# Patient Record
Sex: FEMALE | Race: WHITE | NOT HISPANIC OR LATINO
[De-identification: names, ages, dates, MRNs, and addresses within clinical notes are randomized per-mention and may not be internally consistent; named-entity substitution may affect disease eponyms.]

---

## 2020-08-03 ENCOUNTER — APPOINTMENT (OUTPATIENT)
Dept: OBGYN | Facility: CLINIC | Age: 65
End: 2020-08-03
Payer: COMMERCIAL

## 2020-08-03 VITALS — WEIGHT: 162 LBS | TEMPERATURE: 98.2 F | BODY MASS INDEX: 26.99 KG/M2 | HEIGHT: 65 IN

## 2020-08-03 DIAGNOSIS — K76.9 LIVER DISEASE, UNSPECIFIED: ICD-10-CM

## 2020-08-03 DIAGNOSIS — N95.2 POSTMENOPAUSAL ATROPHIC VAGINITIS: ICD-10-CM

## 2020-08-03 DIAGNOSIS — R18.8 OTHER ASCITES: ICD-10-CM

## 2020-08-03 DIAGNOSIS — Z01.419 ENCOUNTER FOR GYNECOLOGICAL EXAMINATION (GENERAL) (ROUTINE) W/OUT ABNORMAL FINDINGS: ICD-10-CM

## 2020-08-03 DIAGNOSIS — R10.2 PELVIC AND PERINEAL PAIN: ICD-10-CM

## 2020-08-03 LAB
BILIRUB UR QL STRIP: NORMAL
GLUCOSE UR-MCNC: NORMAL
HCG UR QL: 0.2 EU/DL
HGB UR QL STRIP.AUTO: NORMAL
KETONES UR-MCNC: NORMAL
LEUKOCYTE ESTERASE UR QL STRIP: NORMAL
NITRITE UR QL STRIP: NORMAL
PH UR STRIP: 5
PROT UR STRIP-MCNC: NORMAL
SP GR UR STRIP: 1.03

## 2020-08-03 PROCEDURE — 99201 OFFICE OUTPATIENT NEW 10 MINUTES: CPT | Mod: 25

## 2020-08-03 PROCEDURE — 99386 PREV VISIT NEW AGE 40-64: CPT

## 2020-08-03 PROCEDURE — 81003 URINALYSIS AUTO W/O SCOPE: CPT | Mod: QW

## 2020-08-03 RX ORDER — ALPRAZOLAM 2 MG/1
2 TABLET ORAL
Refills: 0 | Status: ACTIVE | COMMUNITY

## 2020-08-03 NOTE — PHYSICAL EXAM
[Awake] : awake [Alert] : alert [Soft] : soft [Oriented x3] : oriented to person, place, and time [Normal] : uterus [Uterine Adnexae] : were not tender and not enlarged [Acute Distress] : no acute distress [LAD] : no lymphadenopathy [Thyroid Nodule] : no thyroid nodule [Goiter] : no goiter [Nipple Discharge] : no nipple discharge [Mass] : no breast mass [Tender] : non tender [Axillary LAD] : no axillary lymphadenopathy [Depressed Mood] : not depressed [Distended] : not distended [Atrophy] : atrophy [Pap Obtained] : a Pap smear was performed [RRR, No Murmurs] : RRR, no murmurs [CTAB] : CTAB

## 2020-08-05 LAB — HPV HIGH+LOW RISK DNA PNL CVX: NOT DETECTED

## 2020-08-06 ENCOUNTER — APPOINTMENT (OUTPATIENT)
Dept: OBGYN | Facility: CLINIC | Age: 65
End: 2020-08-06
Payer: COMMERCIAL

## 2020-08-06 PROCEDURE — 76705 ECHO EXAM OF ABDOMEN: CPT

## 2020-08-06 PROCEDURE — 76830 TRANSVAGINAL US NON-OB: CPT

## 2021-01-23 ENCOUNTER — OUTPATIENT (OUTPATIENT)
Dept: OUTPATIENT SERVICES | Facility: HOSPITAL | Age: 66
LOS: 1 days | Discharge: HOME | End: 2021-01-23

## 2021-01-23 ENCOUNTER — LABORATORY RESULT (OUTPATIENT)
Age: 66
End: 2021-01-23

## 2021-01-23 DIAGNOSIS — Z11.59 ENCOUNTER FOR SCREENING FOR OTHER VIRAL DISEASES: ICD-10-CM

## 2021-01-25 ENCOUNTER — APPOINTMENT (OUTPATIENT)
Dept: INFUSION THERAPY | Facility: CLINIC | Age: 66
End: 2021-01-25

## 2021-02-08 ENCOUNTER — APPOINTMENT (OUTPATIENT)
Dept: INFUSION THERAPY | Facility: CLINIC | Age: 66
End: 2021-02-08
Payer: COMMERCIAL

## 2021-02-08 ENCOUNTER — APPOINTMENT (OUTPATIENT)
Dept: HEMATOLOGY ONCOLOGY | Facility: CLINIC | Age: 66
End: 2021-02-08
Payer: COMMERCIAL

## 2021-02-08 ENCOUNTER — LABORATORY RESULT (OUTPATIENT)
Age: 66
End: 2021-02-08

## 2021-02-08 VITALS
TEMPERATURE: 97.3 F | SYSTOLIC BLOOD PRESSURE: 114 MMHG | WEIGHT: 150 LBS | RESPIRATION RATE: 14 BRPM | HEIGHT: 65 IN | BODY MASS INDEX: 24.99 KG/M2 | HEART RATE: 67 BPM | DIASTOLIC BLOOD PRESSURE: 60 MMHG

## 2021-02-08 PROCEDURE — 99204 OFFICE O/P NEW MOD 45 MIN: CPT

## 2021-02-09 ENCOUNTER — TRANSCRIPTION ENCOUNTER (OUTPATIENT)
Age: 66
End: 2021-02-09

## 2021-02-09 LAB
HCT VFR BLD CALC: 44.3 %
HGB BLD-MCNC: 15.3 G/DL
MCHC RBC-ENTMCNC: 29.5 PG
MCHC RBC-ENTMCNC: 34.5 G/DL
MCV RBC AUTO: 85.4 FL
PLATELET # BLD AUTO: 258 K/UL
PMV BLD: 11.4 FL
RBC # BLD: 5.19 M/UL
RBC # FLD: 12 %
WBC # FLD AUTO: 7.24 K/UL

## 2021-02-09 NOTE — ASSESSMENT
[FreeTextEntry1] : Double heterozygous hemochromatosis. Initial work up, especially ferritin level, not available. S/P 2 phlebotomies with the most recent ferritin showing a value of 200. She is here essentially to continue with her phlebotomies.\par The patient was told what hemochromatosis was and the risks involved. However, she was reassured that she did not have the homozygous C282Y mutation which would have been a much riskier situation. \par Donating blood should not hurt, but the patient's CBC and ferritin should be monitored closely to avoid overtreatment. \par All her questions answered.\par \par She was recommended to follow up with all her other physicians.\par She was also told about the recommended requirements for cancer screenings (breast, Pap smear, colonoscopy). For those, she will follow up with her primary care physician\par \par All questions, which she had many (including several not related to iron overload or phlebotomy), answered.\par \par The patient does not need follow up with us more than once or twice a year. She will be mostly supervised by her other physicians and referred back as needed. We will monitor her for phlebotomy purposes.

## 2021-02-09 NOTE — HISTORY OF PRESENT ILLNESS
[Disease:__________________________] : Disease: [unfilled] [de-identified] : The patient is a 65 year old white female referred by Dr. Saw Aguirre for evaluation and management of hemochromatosis and continuation of phlebotomies.\par The patient was diagnosed with the disease in May.\par The patient was also diagnosed with Covid-19 about a month earlier. \par The patient was having some symptoms about 10 years ago when she started feeling somewhat foggy. She assumed that she was depressed and was treated with different drugs. Eventually, she developed having leg pains. The patient had lost her periods more than 10 years ago. She started also having personality changes, anxiety but she was having a lot going in the family. \par The patient lives with her  who had his leg amputated because of complications from vascular disease.\par The patient is not aware of any liver disease. She had a RLQ pain for which she was sent for a CT of the abdomen and pelvis. UTI was suspected but turned out to be negative. Apparently there was "a mass in the liver, connected to the gallbladder" but she was checked and told not to worry about. \par Review of her CT results showed a 1.6 x1.9 cm lesion for which an MRI was recommended.\par Eventually she was worked up further and she was told that she had hemochromatosis. The patient had then 2 phlebotomies. The patient had hemochromatosis gene mutation test which compound heterozygosity for C282Y and H63D mutations. At that point, she was referred to us. It should be mentioned that the patient already had 2 phlebotomies already.\par The patient had different symptoms. The patient had abdominal complaints including pain, constipation, diarrhea. Her colonoscopy was about 5 months ago and was found to have a small polyp. She was recommended to have another colonoscopy in 3 years.\par \par In any event, the patient was essentially referred to continue with phlebotomies since she had already 2 such interventions and since the blood bank has closed its facility for therapeutic phlebotomies.

## 2021-02-09 NOTE — REVIEW OF SYSTEMS
[Vision Problems] : vision problems [Loss of Hearing] : loss of hearing [Palpitations] : palpitations [Shortness Of Breath] : shortness of breath [Abdominal Pain] : abdominal pain [Constipation] : constipation [Diarrhea] : diarrhea [Joint Pain] : joint pain [Joint Stiffness] : joint stiffness [Dizziness] : dizziness [Fainting] : fainting [Insomnia] : insomnia [Negative] : Heme/Lymph [FreeTextEntry2] : Lost 20 lbs [FreeTextEntry3] : Blurry [FreeTextEntry4] : Right ear mostly since childhood [FreeTextEntry6] : From anxiety [FreeTextEntry9] : Especially knees

## 2021-03-03 ENCOUNTER — LABORATORY RESULT (OUTPATIENT)
Age: 66
End: 2021-03-03

## 2021-03-03 ENCOUNTER — APPOINTMENT (OUTPATIENT)
Dept: INFUSION THERAPY | Facility: CLINIC | Age: 66
End: 2021-03-03

## 2021-03-03 DIAGNOSIS — Z00.00 ENCOUNTER FOR GENERAL ADULT MEDICAL EXAMINATION W/OUT ABNORMAL FINDINGS: ICD-10-CM

## 2021-03-03 LAB
HCT VFR BLD CALC: 43.1 %
HGB BLD-MCNC: 14.8 G/DL
MCHC RBC-ENTMCNC: 29.5 PG
MCHC RBC-ENTMCNC: 34.3 G/DL
MCV RBC AUTO: 85.9 FL
PLATELET # BLD AUTO: 225 K/UL
PMV BLD: 12.8 FL
RBC # BLD: 5.02 M/UL
RBC # FLD: 12.3 %
WBC # FLD AUTO: 7.6 K/UL

## 2021-03-29 ENCOUNTER — APPOINTMENT (OUTPATIENT)
Dept: INFUSION THERAPY | Facility: CLINIC | Age: 66
End: 2021-03-29
Payer: COMMERCIAL

## 2021-03-29 ENCOUNTER — OUTPATIENT (OUTPATIENT)
Dept: OUTPATIENT SERVICES | Facility: HOSPITAL | Age: 66
LOS: 1 days | Discharge: HOME | End: 2021-03-29

## 2021-03-29 ENCOUNTER — APPOINTMENT (OUTPATIENT)
Dept: HEMATOLOGY ONCOLOGY | Facility: CLINIC | Age: 66
End: 2021-03-29
Payer: COMMERCIAL

## 2021-03-29 ENCOUNTER — LABORATORY RESULT (OUTPATIENT)
Age: 66
End: 2021-03-29

## 2021-03-29 VITALS
SYSTOLIC BLOOD PRESSURE: 116 MMHG | DIASTOLIC BLOOD PRESSURE: 68 MMHG | WEIGHT: 147 LBS | TEMPERATURE: 97.6 F | BODY MASS INDEX: 24.49 KG/M2 | RESPIRATION RATE: 14 BRPM | HEART RATE: 86 BPM | HEIGHT: 65 IN

## 2021-03-29 DIAGNOSIS — Z14.8 GENETIC CARRIER OF OTHER DISEASE: ICD-10-CM

## 2021-03-29 LAB
HCT VFR BLD CALC: 42.7 %
HGB BLD-MCNC: 14.5 G/DL
MCHC RBC-ENTMCNC: 28.8 PG
MCHC RBC-ENTMCNC: 34 G/DL
MCV RBC AUTO: 84.9 FL
PLATELET # BLD AUTO: 209 K/UL
PMV BLD: 11.6 FL
RBC # BLD: 5.03 M/UL
RBC # FLD: 12 %
WBC # FLD AUTO: 7.3 K/UL

## 2021-03-29 PROCEDURE — 99213 OFFICE O/P EST LOW 20 MIN: CPT

## 2021-03-30 LAB
ALBUMIN SERPL ELPH-MCNC: 4.5 G/DL
ALP BLD-CCNC: 68 U/L
ALT SERPL-CCNC: 6 U/L
ANION GAP SERPL CALC-SCNC: 10 MMOL/L
AST SERPL-CCNC: 13 U/L
BILIRUB SERPL-MCNC: 0.5 MG/DL
BUN SERPL-MCNC: 23 MG/DL
CALCIUM SERPL-MCNC: 9.7 MG/DL
CHLORIDE SERPL-SCNC: 104 MMOL/L
CO2 SERPL-SCNC: 27 MMOL/L
CREAT SERPL-MCNC: 0.6 MG/DL
FERRITIN SERPL-MCNC: 49 NG/ML
GLUCOSE SERPL-MCNC: 83 MG/DL
IRON SATN MFR SERPL: 37 %
IRON SERPL-MCNC: 117 UG/DL
POTASSIUM SERPL-SCNC: 4.6 MMOL/L
PROT SERPL-MCNC: 7.2 G/DL
SODIUM SERPL-SCNC: 141 MMOL/L
TIBC SERPL-MCNC: 317 UG/DL
UIBC SERPL-MCNC: 200 UG/DL

## 2021-03-30 NOTE — CONSULT LETTER
[Dear  ___] : Dear  [unfilled], [Courtesy Letter:] : I had the pleasure of seeing your patient, [unfilled], in my office today. [Please see my note below.] : Please see my note below. [Consult Closing:] : Thank you very much for allowing me to participate in the care of this patient.  If you have any questions, please do not hesitate to contact me. [Sincerely,] : Sincerely, [FreeTextEntry3] : Dr. MARIFER Christensen [FreeTextEntry2] : Dr. Aguirre

## 2021-03-30 NOTE — HISTORY OF PRESENT ILLNESS
[de-identified] : The patient is here for follow up for her history of double heterozygous hemochromatosis.\par She has no new particular complaints. They have been more or less the same ones as at the time of about 2 months ago her initial visit.\par She is on no new medications.\par She had multiple questions concerning her other complaints which are being followed by her gastroenterologist as well as the primary care physician. Her ;s condition (stroke) is taking a big toll on her.\par Her physical examination in February was essentially not contributory with no obvious abnormalities noted.\par We discussed also her last blood results from beginning of this month.\par She was reminded once more about her screenings. She is following also with her gynecologist regularly. \par We will repeat her CBC, CMP, iron studies with ferritin. We will try to keep ferritin just below 100 avoiding overtreatment. The patient was also given a request for a mammogram for which she's overdue.\par \par All her questions answered.\par \par Further recommendations after the above test results are available.\par \par The patient does not need to see us more often than twice a year and as needed, as long as she keeps her appointments with her PMD, the gynecologist and the gastroenterologist.\par

## 2021-04-15 DIAGNOSIS — E83.119 HEMOCHROMATOSIS, UNSPECIFIED: ICD-10-CM

## 2021-05-28 ENCOUNTER — OUTPATIENT (OUTPATIENT)
Dept: OUTPATIENT SERVICES | Facility: HOSPITAL | Age: 66
LOS: 1 days | Discharge: HOME | End: 2021-05-28
Payer: COMMERCIAL

## 2021-05-28 ENCOUNTER — LABORATORY RESULT (OUTPATIENT)
Age: 66
End: 2021-05-28

## 2021-05-28 ENCOUNTER — RESULT REVIEW (OUTPATIENT)
Age: 66
End: 2021-05-28

## 2021-05-28 ENCOUNTER — APPOINTMENT (OUTPATIENT)
Dept: HEMATOLOGY ONCOLOGY | Facility: CLINIC | Age: 66
End: 2021-05-28

## 2021-05-28 DIAGNOSIS — Z12.31 ENCOUNTER FOR SCREENING MAMMOGRAM FOR MALIGNANT NEOPLASM OF BREAST: ICD-10-CM

## 2021-05-28 LAB
HCT VFR BLD CALC: 42.3 %
HGB BLD-MCNC: 14.4 G/DL
MCHC RBC-ENTMCNC: 28.8 PG
MCHC RBC-ENTMCNC: 34 G/DL
MCV RBC AUTO: 84.6 FL
PLATELET # BLD AUTO: 222 K/UL
PMV BLD: 11.5 FL
RBC # BLD: 5 M/UL
RBC # FLD: 12.4 %
WBC # FLD AUTO: 6.42 K/UL

## 2021-05-28 PROCEDURE — 77067 SCR MAMMO BI INCL CAD: CPT | Mod: 26

## 2021-05-28 PROCEDURE — 77063 BREAST TOMOSYNTHESIS BI: CPT | Mod: 26

## 2021-06-01 LAB — FERRITIN SERPL-MCNC: 46 NG/ML

## 2021-06-07 ENCOUNTER — APPOINTMENT (OUTPATIENT)
Dept: HEMATOLOGY ONCOLOGY | Facility: CLINIC | Age: 66
End: 2021-06-07
Payer: COMMERCIAL

## 2021-06-07 ENCOUNTER — LABORATORY RESULT (OUTPATIENT)
Age: 66
End: 2021-06-07

## 2021-06-07 ENCOUNTER — APPOINTMENT (OUTPATIENT)
Dept: INFUSION THERAPY | Facility: CLINIC | Age: 66
End: 2021-06-07
Payer: COMMERCIAL

## 2021-06-07 LAB
HCT VFR BLD CALC: 43.7 %
HGB BLD-MCNC: 14.9 G/DL
MCHC RBC-ENTMCNC: 29.1 PG
MCHC RBC-ENTMCNC: 34.1 G/DL
MCV RBC AUTO: 85.4 FL
PLATELET # BLD AUTO: 124 K/UL
PMV BLD: 12.4 FL
RBC # BLD: 5.12 M/UL
RBC # FLD: 12.3 %
WBC # FLD AUTO: 6.35 K/UL

## 2021-06-07 PROCEDURE — 99213 OFFICE O/P EST LOW 20 MIN: CPT

## 2021-06-07 NOTE — HISTORY OF PRESENT ILLNESS
[de-identified] : The patient is coming for a follow up for her hemochromatosis and possible phlebotomy.\par Her major complaint seems to be her hair loss. Otherwise, no new particular complaints including gynecological ones.\par The CBC today showed a Hgb of 14.9, WBC 6.35, platelets 124 which is an aberration since the patient's multiple CBCs in the recent past had not shown any thrombocytopenia.\par Her last ferritin from about 10 days ago was only 46.\par The patient was told that she did not require phlebotomy today. The CBC should be repeated in 2 months.\par She was recommended also to see a dermatologist for her hair loss. In the meantime, we will also draw a T4 and TSH to make sure that at least she does not have thyroid issues explaining the hair loss.\par \par All questions answered.

## 2021-06-08 LAB
T4 SERPL-MCNC: 6.8 UG/DL
TSH SERPL-ACNC: 1.31 UIU/ML

## 2021-07-30 ENCOUNTER — APPOINTMENT (OUTPATIENT)
Dept: HEMATOLOGY ONCOLOGY | Facility: CLINIC | Age: 66
End: 2021-07-30

## 2021-07-30 ENCOUNTER — LABORATORY RESULT (OUTPATIENT)
Age: 66
End: 2021-07-30

## 2021-07-30 ENCOUNTER — OUTPATIENT (OUTPATIENT)
Dept: OUTPATIENT SERVICES | Facility: HOSPITAL | Age: 66
LOS: 1 days | Discharge: HOME | End: 2021-07-30

## 2021-07-30 DIAGNOSIS — L65.9 NONSCARRING HAIR LOSS, UNSPECIFIED: ICD-10-CM

## 2021-07-30 DIAGNOSIS — E83.119 HEMOCHROMATOSIS, UNSPECIFIED: ICD-10-CM

## 2021-07-30 LAB
HCT VFR BLD CALC: 43.8 %
HGB BLD-MCNC: 14.9 G/DL
MCHC RBC-ENTMCNC: 29.2 PG
MCHC RBC-ENTMCNC: 34 G/DL
MCV RBC AUTO: 85.7 FL
PLATELET # BLD AUTO: 242 K/UL
PMV BLD: 11 FL
RBC # BLD: 5.11 M/UL
RBC # FLD: 11.9 %
WBC # FLD AUTO: 5.82 K/UL

## 2021-08-02 LAB
COVID-19 NUCLEOCAPSID  GAM ANTIBODY INTERPRETATION: POSITIVE
FERRITIN SERPL-MCNC: 92 NG/ML
SARS-COV-2 AB SERPL QL IA: 37.9 INDEX

## 2021-09-09 ENCOUNTER — OUTPATIENT (OUTPATIENT)
Dept: OUTPATIENT SERVICES | Facility: HOSPITAL | Age: 66
LOS: 1 days | Discharge: HOME | End: 2021-09-09
Payer: MEDICARE

## 2021-09-09 ENCOUNTER — RESULT REVIEW (OUTPATIENT)
Age: 66
End: 2021-09-09

## 2021-09-09 DIAGNOSIS — R92.8 OTHER ABNORMAL AND INCONCLUSIVE FINDINGS ON DIAGNOSTIC IMAGING OF BREAST: ICD-10-CM

## 2021-09-09 PROCEDURE — 77065 DX MAMMO INCL CAD UNI: CPT | Mod: 26,RT

## 2021-09-09 PROCEDURE — G0279: CPT | Mod: 26

## 2021-09-09 PROCEDURE — 76642 ULTRASOUND BREAST LIMITED: CPT | Mod: 26,RT

## 2021-09-15 ENCOUNTER — OUTPATIENT (OUTPATIENT)
Dept: OUTPATIENT SERVICES | Facility: HOSPITAL | Age: 66
LOS: 1 days | Discharge: HOME | End: 2021-09-15

## 2021-09-15 ENCOUNTER — LABORATORY RESULT (OUTPATIENT)
Age: 66
End: 2021-09-15

## 2021-09-15 ENCOUNTER — APPOINTMENT (OUTPATIENT)
Dept: HEMATOLOGY ONCOLOGY | Facility: CLINIC | Age: 66
End: 2021-09-15

## 2021-09-16 DIAGNOSIS — E83.119 HEMOCHROMATOSIS, UNSPECIFIED: ICD-10-CM

## 2021-09-16 LAB
FERRITIN SERPL-MCNC: 77 NG/ML
HCT VFR BLD CALC: 43.9 %
HGB BLD-MCNC: 14.6 G/DL
MCHC RBC-ENTMCNC: 28.9 PG
MCHC RBC-ENTMCNC: 33.3 G/DL
MCV RBC AUTO: 86.9 FL
PLATELET # BLD AUTO: 245 K/UL
PMV BLD: 10.9 FL
RBC # BLD: 5.05 M/UL
RBC # FLD: 12.1 %
WBC # FLD AUTO: 5.52 K/UL

## 2021-10-21 ENCOUNTER — APPOINTMENT (OUTPATIENT)
Dept: HEMATOLOGY ONCOLOGY | Facility: CLINIC | Age: 66
End: 2021-10-21

## 2021-10-25 ENCOUNTER — APPOINTMENT (OUTPATIENT)
Dept: HEMATOLOGY ONCOLOGY | Facility: CLINIC | Age: 66
End: 2021-10-25
Payer: MEDICARE

## 2021-10-25 ENCOUNTER — OUTPATIENT (OUTPATIENT)
Dept: OUTPATIENT SERVICES | Facility: HOSPITAL | Age: 66
LOS: 1 days | Discharge: HOME | End: 2021-10-25

## 2021-10-25 DIAGNOSIS — E83.119 HEMOCHROMATOSIS, UNSPECIFIED: ICD-10-CM

## 2021-10-25 PROCEDURE — 99442: CPT | Mod: 95

## 2021-10-26 NOTE — HISTORY OF PRESENT ILLNESS
[Home] : at home, [unfilled] , at the time of the visit. [Medical Office: ([facility])___] : at the medical office located in  [Verbal consent obtained from patient] : the patient, [unfilled] [Disease:__________________________] : Disease: [unfilled] [de-identified] : The patient wanted to have a Telehealth visit instead of coming to the office. She has heterozygous mutation of both C282Y and H63d genes.\par At present, she is denying any new particular complaints.\par Since starting on Biotin, her hair loss has stopped.\par She had received the Covid-19 vaccine a couple of months ago but after the second injection she ended up being sick for almost 9 days. Since, she has recovered.\par She is on no new medications.\par We went over her blood work results from September 15: Her ferritin was 77 and her hemogram was within normal limits. We also discussed the results of her thyroid function tests.\par She was told that she didn't need any phlebotomy at this time. \par Although with some delay, but she has finally proceeded with her mammogram as well as US evaluation of her breasts and no suspicious lesions were seen.\par The patient had multiple questions about the present situation with Covid all of which were answered to her satisfaction.\par The patient was recommended to have her iron studies repeated in 2-3 months.\par \par All questions answered.\par

## 2021-10-26 NOTE — REVIEW OF SYSTEMS
[Vision Problems] : vision problems [Loss of Hearing] : loss of hearing [Palpitations] : palpitations [Shortness Of Breath] : shortness of breath [Constipation] : constipation [Joint Pain] : joint pain [Joint Stiffness] : joint stiffness [Dizziness] : dizziness [Insomnia] : insomnia [Negative] : Heme/Lymph

## 2022-03-25 ENCOUNTER — LABORATORY RESULT (OUTPATIENT)
Age: 67
End: 2022-03-25

## 2022-03-25 ENCOUNTER — APPOINTMENT (OUTPATIENT)
Dept: HEMATOLOGY ONCOLOGY | Facility: CLINIC | Age: 67
End: 2022-03-25
Payer: MEDICARE

## 2022-03-25 ENCOUNTER — OUTPATIENT (OUTPATIENT)
Dept: OUTPATIENT SERVICES | Facility: HOSPITAL | Age: 67
LOS: 1 days | Discharge: HOME | End: 2022-03-25

## 2022-03-25 VITALS
SYSTOLIC BLOOD PRESSURE: 102 MMHG | TEMPERATURE: 97.2 F | RESPIRATION RATE: 18 BRPM | BODY MASS INDEX: 22.99 KG/M2 | WEIGHT: 138 LBS | HEART RATE: 72 BPM | DIASTOLIC BLOOD PRESSURE: 58 MMHG | HEIGHT: 65 IN

## 2022-03-25 LAB
HCT VFR BLD CALC: 41.8 %
HGB BLD-MCNC: 14.2 G/DL
MCHC RBC-ENTMCNC: 29.1 PG
MCHC RBC-ENTMCNC: 34 G/DL
MCV RBC AUTO: 85.7 FL
PLATELET # BLD AUTO: 236 K/UL
PMV BLD: 10 FL
RBC # BLD: 4.88 M/UL
RBC # FLD: 12.3 %
WBC # FLD AUTO: 5.94 K/UL

## 2022-03-25 PROCEDURE — 99214 OFFICE O/P EST MOD 30 MIN: CPT

## 2022-03-25 RX ORDER — SERTRALINE HYDROCHLORIDE 25 MG/1
25 TABLET, FILM COATED ORAL
Refills: 0 | Status: DISCONTINUED | COMMUNITY
End: 2022-03-25

## 2022-03-25 NOTE — PHYSICAL EXAM
[Restricted in physically strenuous activity but ambulatory and able to carry out work of a light or sedentary nature] : Status 1- Restricted in physically strenuous activity but ambulatory and able to carry out work of a light or sedentary nature, e.g., light house work, office work [Normal] : affect appropriate [de-identified] : Arthritic changes

## 2022-03-25 NOTE — REVIEW OF SYSTEMS
[Fatigue] : fatigue [Vision Problems] : vision problems [Abdominal Pain] : abdominal pain [Joint Pain] : joint pain [Insomnia] : insomnia [Negative] : Heme/Lymph [FreeTextEntry3] : Blurry [FreeTextEntry5] : Cold legs only when stomach hurts. Responds to heating pad. [FreeTextEntry8] : However, may have a urinary problem when she gets very complicated. [de-identified] : Psoriasis on scalp

## 2022-03-25 NOTE — HISTORY OF PRESENT ILLNESS
[Disease:__________________________] : Disease: [unfilled] [de-identified] : The patient is coming for a follow up for her combined heterozygous mutation of the hemochromatosis gene.\par She has her chronic arthralgias on and off.\par She thinks that her brain is not functioning as well and is having some memory deficits on and off and is having difficulty dealing with many problems that she was able to performed at once.\par The patient is up to date with her screenings.\par She had a CT of the abdomen and pelvis which didn't show any significant abnormalities.\par She had Covid last April.

## 2022-03-25 NOTE — ASSESSMENT
[FreeTextEntry1] : 1. 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 Anxiety/depression. \par \par The situation was discussed with the patient.\par Will obtain CBC, CMP and iron studies including ferritin.\par She is up to date with her screenings.\par Dietary advice given.\par \par Further recommendations after the above completed.\par \par All questions answered.\par \par If the above tests are within acceptable limits, she will be seen again in 6 -12 months and as needed.\par

## 2022-03-28 DIAGNOSIS — E83.119 HEMOCHROMATOSIS, UNSPECIFIED: ICD-10-CM

## 2022-03-28 LAB
ALBUMIN SERPL ELPH-MCNC: 4.2 G/DL
ALP BLD-CCNC: 62 U/L
ALT SERPL-CCNC: <5 U/L
ANION GAP SERPL CALC-SCNC: 9 MMOL/L
AST SERPL-CCNC: 10 U/L
BILIRUB SERPL-MCNC: 0.7 MG/DL
BUN SERPL-MCNC: 18 MG/DL
CALCIUM SERPL-MCNC: 9.2 MG/DL
CHLORIDE SERPL-SCNC: 103 MMOL/L
CO2 SERPL-SCNC: 25 MMOL/L
CREAT SERPL-MCNC: 0.5 MG/DL
EGFR: 104 ML/MIN/1.73M2
FERRITIN SERPL-MCNC: 99 NG/ML
GLUCOSE SERPL-MCNC: 80 MG/DL
IRON SATN MFR SERPL: 55 %
IRON SERPL-MCNC: 137 UG/DL
POTASSIUM SERPL-SCNC: 4.3 MMOL/L
PROT SERPL-MCNC: 6.4 G/DL
SODIUM SERPL-SCNC: 137 MMOL/L
TIBC SERPL-MCNC: 248 UG/DL
UIBC SERPL-MCNC: 111 UG/DL

## 2022-07-26 ENCOUNTER — OUTPATIENT (OUTPATIENT)
Dept: OUTPATIENT SERVICES | Facility: HOSPITAL | Age: 67
LOS: 1 days | Discharge: HOME | End: 2022-07-26

## 2022-07-26 ENCOUNTER — LABORATORY RESULT (OUTPATIENT)
Age: 67
End: 2022-07-26

## 2022-07-26 ENCOUNTER — APPOINTMENT (OUTPATIENT)
Dept: HEMATOLOGY ONCOLOGY | Facility: CLINIC | Age: 67
End: 2022-07-26

## 2022-07-26 LAB
HCT VFR BLD CALC: 43.9 %
HGB BLD-MCNC: 15 G/DL
MCHC RBC-ENTMCNC: 29.5 PG
MCHC RBC-ENTMCNC: 34.2 G/DL
MCV RBC AUTO: 86.2 FL
PLATELET # BLD AUTO: 248 K/UL
PMV BLD: 10.6 FL
RBC # BLD: 5.09 M/UL
RBC # FLD: 12 %
WBC # FLD AUTO: 6.74 K/UL

## 2022-07-27 LAB
ALBUMIN SERPL ELPH-MCNC: 4.5 G/DL
ALP BLD-CCNC: 78 U/L
ALT SERPL-CCNC: 9 U/L
ANION GAP SERPL CALC-SCNC: 11 MMOL/L
AST SERPL-CCNC: 13 U/L
BILIRUB SERPL-MCNC: 0.3 MG/DL
BUN SERPL-MCNC: 25 MG/DL
CALCIUM SERPL-MCNC: 9.3 MG/DL
CHLORIDE SERPL-SCNC: 102 MMOL/L
CO2 SERPL-SCNC: 26 MMOL/L
CREAT SERPL-MCNC: 0.5 MG/DL
EGFR: 104 ML/MIN/1.73M2
FERRITIN SERPL-MCNC: 111 NG/ML
GLUCOSE SERPL-MCNC: 74 MG/DL
IRON SATN MFR SERPL: 32 %
IRON SERPL-MCNC: 78 UG/DL
POTASSIUM SERPL-SCNC: 5.2 MMOL/L
PROT SERPL-MCNC: 6.7 G/DL
SODIUM SERPL-SCNC: 139 MMOL/L
TIBC SERPL-MCNC: 244 UG/DL
UIBC SERPL-MCNC: 166 UG/DL

## 2022-08-18 DIAGNOSIS — E83.119 HEMOCHROMATOSIS, UNSPECIFIED: ICD-10-CM

## 2023-02-21 ENCOUNTER — APPOINTMENT (OUTPATIENT)
Age: 68
End: 2023-02-21

## 2023-03-10 ENCOUNTER — LABORATORY RESULT (OUTPATIENT)
Age: 68
End: 2023-03-10

## 2023-03-10 ENCOUNTER — OUTPATIENT (OUTPATIENT)
Dept: OUTPATIENT SERVICES | Facility: HOSPITAL | Age: 68
LOS: 1 days | End: 2023-03-10
Payer: MEDICARE

## 2023-03-10 ENCOUNTER — APPOINTMENT (OUTPATIENT)
Dept: HEMATOLOGY ONCOLOGY | Facility: CLINIC | Age: 68
End: 2023-03-10

## 2023-03-10 DIAGNOSIS — E83.119 HEMOCHROMATOSIS, UNSPECIFIED: ICD-10-CM

## 2023-03-10 LAB
ALBUMIN SERPL ELPH-MCNC: 4 G/DL
ALP BLD-CCNC: 68 U/L
ALT SERPL-CCNC: 7 U/L
ANION GAP SERPL CALC-SCNC: 10 MMOL/L
AST SERPL-CCNC: 15 U/L
BILIRUB SERPL-MCNC: 0.7 MG/DL
BUN SERPL-MCNC: 14 MG/DL
CALCIUM SERPL-MCNC: 9.3 MG/DL
CHLORIDE SERPL-SCNC: 106 MMOL/L
CO2 SERPL-SCNC: 24 MMOL/L
CREAT SERPL-MCNC: 0.5 MG/DL
EGFR: 103 ML/MIN/1.73M2
GLUCOSE SERPL-MCNC: 92 MG/DL
HCT VFR BLD CALC: 41.6 %
HGB BLD-MCNC: 14.1 G/DL
IRON SATN MFR SERPL: 60 %
IRON SERPL-MCNC: 149 UG/DL
MCHC RBC-ENTMCNC: 29.4 PG
MCHC RBC-ENTMCNC: 33.9 G/DL
MCV RBC AUTO: 86.8 FL
PLATELET # BLD AUTO: 214 K/UL
PMV BLD: 11 FL
POTASSIUM SERPL-SCNC: 4.6 MMOL/L
PROT SERPL-MCNC: 6.3 G/DL
RBC # BLD: 4.79 M/UL
RBC # FLD: 12 %
SODIUM SERPL-SCNC: 140 MMOL/L
TIBC SERPL-MCNC: 248 UG/DL
UIBC SERPL-MCNC: 99 UG/DL
WBC # FLD AUTO: 5.82 K/UL

## 2023-03-10 PROCEDURE — 83550 IRON BINDING TEST: CPT

## 2023-03-10 PROCEDURE — 83540 ASSAY OF IRON: CPT

## 2023-03-10 PROCEDURE — 36415 COLL VENOUS BLD VENIPUNCTURE: CPT

## 2023-03-10 PROCEDURE — 85027 COMPLETE CBC AUTOMATED: CPT

## 2023-03-10 PROCEDURE — 82607 VITAMIN B-12: CPT

## 2023-03-10 PROCEDURE — 82728 ASSAY OF FERRITIN: CPT

## 2023-03-10 PROCEDURE — 80053 COMPREHEN METABOLIC PANEL: CPT

## 2023-03-11 DIAGNOSIS — E83.119 HEMOCHROMATOSIS, UNSPECIFIED: ICD-10-CM

## 2023-03-13 LAB
FERRITIN SERPL-MCNC: 152 NG/ML
VIT B12 SERPL-MCNC: 269 PG/ML

## 2023-03-14 ENCOUNTER — NON-APPOINTMENT (OUTPATIENT)
Age: 68
End: 2023-03-14

## 2023-03-23 ENCOUNTER — APPOINTMENT (OUTPATIENT)
Dept: INFUSION THERAPY | Facility: CLINIC | Age: 68
End: 2023-03-23
Payer: MEDICARE

## 2023-03-23 ENCOUNTER — APPOINTMENT (OUTPATIENT)
Dept: HEMATOLOGY ONCOLOGY | Facility: CLINIC | Age: 68
End: 2023-03-23
Payer: MEDICARE

## 2023-03-23 ENCOUNTER — OUTPATIENT (OUTPATIENT)
Dept: OUTPATIENT SERVICES | Facility: HOSPITAL | Age: 68
LOS: 1 days | End: 2023-03-23
Payer: MEDICARE

## 2023-03-23 ENCOUNTER — LABORATORY RESULT (OUTPATIENT)
Age: 68
End: 2023-03-23

## 2023-03-23 VITALS
SYSTOLIC BLOOD PRESSURE: 102 MMHG | HEIGHT: 65 IN | RESPIRATION RATE: 16 BRPM | WEIGHT: 133 LBS | HEART RATE: 90 BPM | DIASTOLIC BLOOD PRESSURE: 73 MMHG | BODY MASS INDEX: 22.16 KG/M2 | TEMPERATURE: 98.5 F

## 2023-03-23 DIAGNOSIS — E83.119 HEMOCHROMATOSIS, UNSPECIFIED: ICD-10-CM

## 2023-03-23 LAB
HCT VFR BLD CALC: 43.6 %
HGB BLD-MCNC: 15 G/DL
MCHC RBC-ENTMCNC: 29.4 PG
MCHC RBC-ENTMCNC: 34.4 G/DL
MCV RBC AUTO: 85.3 FL
PLATELET # BLD AUTO: 218 K/UL
PMV BLD: 11.3 FL
RBC # BLD: 5.11 M/UL
RBC # FLD: 11.9 %
WBC # FLD AUTO: 8.6 K/UL

## 2023-03-23 PROCEDURE — 99213 OFFICE O/P EST LOW 20 MIN: CPT

## 2023-03-23 PROCEDURE — 99213 OFFICE O/P EST LOW 20 MIN: CPT | Mod: 25

## 2023-03-23 PROCEDURE — 99195 PHLEBOTOMY: CPT

## 2023-03-23 PROCEDURE — 85027 COMPLETE CBC AUTOMATED: CPT

## 2023-03-24 DIAGNOSIS — Z14.8 GENETIC CARRIER OF OTHER DISEASE: ICD-10-CM

## 2023-03-24 NOTE — REASON FOR VISIT
[Follow-Up Visit] : a follow-up visit for [Family Member] : family member [FreeTextEntry2] : Compound heterozygous

## 2023-03-24 NOTE — ASSESSMENT
[FreeTextEntry1] : Compound heterozygous hemochromatosis gene mutation clinically stable from that angle.\par Her last ferritin was up to 152. The patient wants to have another phlebotomy. She thinks that some of her symptoms could be alleviated by the procedure since that has happened in the past.\par I don't see any contraindication for it. Her CBC from a couple of weeks ago had shown a Hgb of 14.1 with normal WBC and platelets count.\par \par The patient will be scheduled for phlebotomy.. \par \par All questions answered.\par \par The blood work may be repeated in 2-3 months.\par \par F/U in 6 months and as needed.

## 2023-03-24 NOTE — REVIEW OF SYSTEMS
[Fatigue] : fatigue [Vision Problems] : vision problems [Palpitations] : palpitations [Cough] : cough [Abdominal Pain] : abdominal pain [Constipation] : constipation [Joint Pain] : joint pain [Joint Stiffness] : joint stiffness [Skin Rash] : skin rash [Dizziness] : dizziness [Insomnia] : insomnia [Negative] : Heme/Lymph [FreeTextEntry3] : Vision gets blurry after watching TV for 3 hours and may develop tearing. [FreeTextEntry5] : With certain foods [FreeTextEntry9] : Specially elbows and knees

## 2023-03-24 NOTE — HISTORY OF PRESENT ILLNESS
[Disease:__________________________] : Disease: [unfilled] [de-identified] : The patient is coming for her yearly follow up for her double heterozygous mutation of hemochromatosis genes, C282Y and H63D.\par Patient's more recent ferritin has shown a value of 152. The patient is willing to have a phlebotomy. She is not feeling too well today. She is having some headache although she stated that it may be from the weather and also complaining of back pain and epigastric discomfort. Her last colonoscopy was 3-4 years ago and apparently a very small polyp was found. \par She is due for another GI evaluation with colonoscopy and she wants also to have a clear idea of what's happening with her stomach issues.\par In addition, she is constipated and she has to use stool softeners or laxatives almost daily. She is followed by the gastroenterologist.\par She is also followed by the dermatologist for facial issues.\par She had Covid infection in 2020.

## 2023-04-07 ENCOUNTER — OUTPATIENT (OUTPATIENT)
Dept: OUTPATIENT SERVICES | Facility: HOSPITAL | Age: 68
LOS: 1 days | End: 2023-04-07
Payer: MEDICARE

## 2023-04-07 ENCOUNTER — APPOINTMENT (OUTPATIENT)
Dept: INFUSION THERAPY | Facility: CLINIC | Age: 68
End: 2023-04-07

## 2023-04-07 ENCOUNTER — LABORATORY RESULT (OUTPATIENT)
Age: 68
End: 2023-04-07

## 2023-04-07 DIAGNOSIS — E83.119 HEMOCHROMATOSIS, UNSPECIFIED: ICD-10-CM

## 2023-04-07 DIAGNOSIS — Z14.8 GENETIC CARRIER OF OTHER DISEASE: ICD-10-CM

## 2023-04-07 LAB
HCT VFR BLD CALC: 43.6 %
HGB BLD-MCNC: 14.6 G/DL
MCHC RBC-ENTMCNC: 29.1 PG
MCHC RBC-ENTMCNC: 33.5 G/DL
MCV RBC AUTO: 86.9 FL
PLATELET # BLD AUTO: 144 K/UL
PMV BLD: 11.8 FL
RBC # BLD: 5.02 M/UL
RBC # FLD: 12.3 %
WBC # FLD AUTO: 5.39 K/UL

## 2023-04-07 PROCEDURE — 36415 COLL VENOUS BLD VENIPUNCTURE: CPT

## 2023-04-07 PROCEDURE — 82728 ASSAY OF FERRITIN: CPT

## 2023-04-07 PROCEDURE — 85027 COMPLETE CBC AUTOMATED: CPT

## 2023-04-08 LAB — FERRITIN SERPL-MCNC: 95 NG/ML

## 2023-05-05 ENCOUNTER — APPOINTMENT (OUTPATIENT)
Dept: INFUSION THERAPY | Facility: CLINIC | Age: 68
End: 2023-05-05

## 2024-01-26 PROBLEM — Z72.0 USE OF NICOTINE CONTAINING SUBSTANCE IN COMBUSTION-FREE VAPORIZATION DEVICE: Status: ACTIVE | Noted: 2020-08-03

## 2024-01-26 PROBLEM — Z83.3 FAMILY HISTORY OF DIABETES MELLITUS: Status: ACTIVE | Noted: 2020-08-03

## 2024-01-26 PROBLEM — F41.9 ANXIETY AND DEPRESSION: Status: ACTIVE | Noted: 2020-08-03

## 2024-01-26 PROBLEM — Z80.1 FAMILY HISTORY OF LUNG CANCER: Status: ACTIVE | Noted: 2020-08-03

## 2024-01-26 PROBLEM — E78.5 HLD (HYPERLIPIDEMIA): Status: ACTIVE | Noted: 2024-01-26

## 2024-01-26 PROBLEM — Z80.0 FAMILY HISTORY OF MALIGNANT NEOPLASM OF STOMACH: Status: ACTIVE | Noted: 2020-08-03

## 2024-01-26 PROBLEM — Z80.7 FAMILY HISTORY OF NON-HODGKIN'S LYMPHOMA: Status: ACTIVE | Noted: 2020-08-03

## 2024-01-26 NOTE — PHYSICAL EXAM
[Well Developed] : well developed [Well Nourished] : well nourished [No Acute Distress] : no acute distress [Normal Conjunctiva] : normal conjunctiva [Normal Venous Pressure] : normal venous pressure [No Carotid Bruit] : no carotid bruit [5th Left ICS - MCL] : palpated at the 5th LICS in the midclavicular line [Normal] : normal [No Precordial Heave] : no precordial heave was noted [Normal Rate] : normal [Rhythm Regular] : regular [Normal S1] : normal S1 [Normal S2] : normal S2 [No Murmur] : no murmurs heard [No Pitting Edema] : no pitting edema present [2+] : right 2+ [Clear Lung Fields] : clear lung fields [Good Air Entry] : good air entry [No Respiratory Distress] : no respiratory distress  [Soft] : abdomen soft [Non Tender] : non-tender [No Masses/organomegaly] : no masses/organomegaly [Normal Bowel Sounds] : normal bowel sounds [Normal Gait] : normal gait [No Edema] : no edema [No Cyanosis] : no cyanosis [No Clubbing] : no clubbing [No Varicosities] : no varicosities [No Rash] : no rash [No Skin Lesions] : no skin lesions [Moves all extremities] : moves all extremities [No Focal Deficits] : no focal deficits [Normal Speech] : normal speech [Alert and Oriented] : alert and oriented [Normal memory] : normal memory

## 2024-01-29 ENCOUNTER — APPOINTMENT (OUTPATIENT)
Dept: CARDIOLOGY | Facility: CLINIC | Age: 69
End: 2024-01-29
Payer: MEDICARE

## 2024-01-29 VITALS
HEIGHT: 65 IN | HEART RATE: 59 BPM | WEIGHT: 138 LBS | OXYGEN SATURATION: 98 % | SYSTOLIC BLOOD PRESSURE: 104 MMHG | BODY MASS INDEX: 22.99 KG/M2 | DIASTOLIC BLOOD PRESSURE: 64 MMHG

## 2024-01-29 DIAGNOSIS — R06.09 OTHER FORMS OF DYSPNEA: ICD-10-CM

## 2024-01-29 DIAGNOSIS — Z80.7 FAMILY HISTORY OF OTHER MALIGNANT NEOPLASMS OF LYMPHOID, HEMATOPOIETIC AND RELATED TISSUES: ICD-10-CM

## 2024-01-29 DIAGNOSIS — Z80.1 FAMILY HISTORY OF MALIGNANT NEOPLASM OF TRACHEA, BRONCHUS AND LUNG: ICD-10-CM

## 2024-01-29 DIAGNOSIS — F41.9 ANXIETY DISORDER, UNSPECIFIED: ICD-10-CM

## 2024-01-29 DIAGNOSIS — R00.1 BRADYCARDIA, UNSPECIFIED: ICD-10-CM

## 2024-01-29 DIAGNOSIS — R10.9 UNSPECIFIED ABDOMINAL PAIN: ICD-10-CM

## 2024-01-29 DIAGNOSIS — Z83.3 FAMILY HISTORY OF DIABETES MELLITUS: ICD-10-CM

## 2024-01-29 DIAGNOSIS — E78.5 HYPERLIPIDEMIA, UNSPECIFIED: ICD-10-CM

## 2024-01-29 DIAGNOSIS — Z72.0 TOBACCO USE: ICD-10-CM

## 2024-01-29 DIAGNOSIS — M25.50 PAIN IN UNSPECIFIED JOINT: ICD-10-CM

## 2024-01-29 DIAGNOSIS — Z80.0 FAMILY HISTORY OF MALIGNANT NEOPLASM OF DIGESTIVE ORGANS: ICD-10-CM

## 2024-01-29 DIAGNOSIS — F32.A ANXIETY DISORDER, UNSPECIFIED: ICD-10-CM

## 2024-01-29 PROCEDURE — 93000 ELECTROCARDIOGRAM COMPLETE: CPT

## 2024-01-29 PROCEDURE — 99204 OFFICE O/P NEW MOD 45 MIN: CPT

## 2024-01-29 RX ORDER — FLUOXETINE HYDROCHLORIDE 20 MG/1
20 CAPSULE ORAL
Refills: 0 | Status: DISCONTINUED | COMMUNITY
End: 2024-01-29

## 2024-01-29 NOTE — REVIEW OF SYSTEMS
[Negative] : Heme/Lymph [SOB] : shortness of breath [Dyspnea on exertion] : dyspnea during exertion [Chest Discomfort] : chest discomfort

## 2024-01-29 NOTE — REASON FOR VISIT
[Other: ____] : [unfilled] [FreeTextEntry1] : Diagnostic Tests:  ----------------------- EK24-Sinus bradycardia at 56 bpm. Low voltage.

## 2024-01-29 NOTE — ASSESSMENT
[FreeTextEntry1] : SILVERMAN/CP:  -Check CCTA and TTE.  -HR at goal.  -No premedication given.   Hemochromatosis: SB on EKG but no other signs of conduction disease.  -Check TTE.   HLD: , TG 97, HDL 57,  (01/24) -Will defer medication until after CCTA.  -Likely will still need statin.  -Given elevation, may be genetic component and will need genetic testing.  -Discussed therapeutic lifestyle changes to promote improved lipid metabolism.   Follow up in 3 months

## 2024-01-29 NOTE — HISTORY OF PRESENT ILLNESS
[FreeTextEntry1] : Ms. Voss is a 69yo F with PMHx of HLD, hemochromatosis who presents to [facility] care. Her PMD is Dr. Zhao Vega. She has been feeling dizzy at times. She has also been having elevated cholesterol, which has fluctuated in the past. She has gotten SOB with walking up stairs. SOB also at rest.

## 2024-03-23 ENCOUNTER — APPOINTMENT (OUTPATIENT)
Dept: CARDIOLOGY | Facility: CLINIC | Age: 69
End: 2024-03-23

## 2024-05-09 ENCOUNTER — APPOINTMENT (OUTPATIENT)
Dept: CARDIOLOGY | Facility: CLINIC | Age: 69
End: 2024-05-09

## 2024-05-10 ENCOUNTER — APPOINTMENT (OUTPATIENT)
Age: 69
End: 2024-05-10
Payer: MEDICARE

## 2024-05-10 ENCOUNTER — LABORATORY RESULT (OUTPATIENT)
Age: 69
End: 2024-05-10

## 2024-05-10 VITALS
DIASTOLIC BLOOD PRESSURE: 67 MMHG | SYSTOLIC BLOOD PRESSURE: 102 MMHG | WEIGHT: 136.7 LBS | RESPIRATION RATE: 17 BRPM | HEIGHT: 65 IN | OXYGEN SATURATION: 98 % | HEART RATE: 64 BPM | TEMPERATURE: 97.6 F | BODY MASS INDEX: 22.78 KG/M2

## 2024-05-10 DIAGNOSIS — E83.119 HEMOCHROMATOSIS, UNSPECIFIED: ICD-10-CM

## 2024-05-10 LAB
HCT VFR BLD CALC: 45.6 %
HGB BLD-MCNC: 15.8 G/DL
MCHC RBC-ENTMCNC: 29.8 PG
MCHC RBC-ENTMCNC: 34.6 G/DL
MCV RBC AUTO: 86 FL
PLATELET # BLD AUTO: 261 K/UL
PMV BLD: 10.9 FL
RBC # BLD: 5.3 M/UL
RBC # FLD: 11.9 %
WBC # FLD AUTO: 7.46 K/UL

## 2024-05-10 PROCEDURE — 99214 OFFICE O/P EST MOD 30 MIN: CPT

## 2024-05-10 NOTE — ASSESSMENT
[FreeTextEntry1] : Compound heterozygous hemochromatosis gene mutation, clinically stable.  Her last ferritin was 95 in 4/23 after phlebotomy in 3/23.  Will repeat CBC, ferritin, iron studies today. She was instructed to call us next week for discussion of results and plan. All questions answered.  F/U in 6 months and as needed.

## 2024-05-10 NOTE — HISTORY OF PRESENT ILLNESS
[Disease:__________________________] : Disease: [unfilled] [de-identified] : The patient is coming for her yearly follow up for her double heterozygous mutation of hemochromatosis genes, C282Y and H63D. Patient's last ferritin level drawn in April 2023 was 95. She feels well today, has no new complaints. She continues to struggle with chronic constipation. She had colonoscopy done about 4 years ago. She denies headache or dizziness, no SOB or chest pain.

## 2024-05-10 NOTE — REVIEW OF SYSTEMS
[Fatigue] : fatigue [Palpitations] : palpitations [Abdominal Pain] : abdominal pain [Constipation] : constipation [Joint Pain] : joint pain [Joint Stiffness] : joint stiffness [Skin Rash] : skin rash [Dizziness] : dizziness [Insomnia] : insomnia [Vision Problems] : no vision problems [Cough] : no cough [Negative] : Respiratory [FreeTextEntry9] : Specially elbows and knees

## 2024-05-13 ENCOUNTER — APPOINTMENT (OUTPATIENT)
Dept: CARDIOLOGY | Facility: CLINIC | Age: 69
End: 2024-05-13

## 2024-05-13 LAB
ALBUMIN SERPL ELPH-MCNC: 4.5 G/DL
ALP BLD-CCNC: 79 U/L
ALT SERPL-CCNC: 8 U/L
ANION GAP SERPL CALC-SCNC: 11 MMOL/L
AST SERPL-CCNC: 14 U/L
BILIRUB SERPL-MCNC: 0.6 MG/DL
BUN SERPL-MCNC: 18 MG/DL
CALCIUM SERPL-MCNC: 9.8 MG/DL
CHLORIDE SERPL-SCNC: 103 MMOL/L
CO2 SERPL-SCNC: 25 MMOL/L
CREAT SERPL-MCNC: 0.7 MG/DL
EGFR: 95 ML/MIN/1.73M2
FERRITIN SERPL-MCNC: 112 NG/ML
GLUCOSE SERPL-MCNC: 100 MG/DL
IRON SATN MFR SERPL: 57 %
IRON SERPL-MCNC: 149 UG/DL
POTASSIUM SERPL-SCNC: 4.4 MMOL/L
PROT SERPL-MCNC: 7.1 G/DL
SODIUM SERPL-SCNC: 139 MMOL/L
TIBC SERPL-MCNC: 262 UG/DL
UIBC SERPL-MCNC: 113 UG/DL

## 2024-05-21 ENCOUNTER — APPOINTMENT (OUTPATIENT)
Age: 69
End: 2024-05-21

## 2024-11-08 ENCOUNTER — APPOINTMENT (OUTPATIENT)
Age: 69
End: 2024-11-08